# Patient Record
Sex: FEMALE | Race: WHITE | NOT HISPANIC OR LATINO | URBAN - METROPOLITAN AREA
[De-identification: names, ages, dates, MRNs, and addresses within clinical notes are randomized per-mention and may not be internally consistent; named-entity substitution may affect disease eponyms.]

---

## 2019-10-14 ENCOUNTER — OFFICE VISIT (OUTPATIENT)
Dept: ENDOCRINOLOGY | Facility: CLINIC | Age: 69
End: 2019-10-14

## 2019-10-14 VITALS
DIASTOLIC BLOOD PRESSURE: 68 MMHG | OXYGEN SATURATION: 98 % | HEART RATE: 75 BPM | WEIGHT: 210 LBS | HEIGHT: 67 IN | BODY MASS INDEX: 32.96 KG/M2 | SYSTOLIC BLOOD PRESSURE: 118 MMHG

## 2019-10-14 DIAGNOSIS — E11.65 UNCONTROLLED TYPE 2 DIABETES MELLITUS WITH HYPERGLYCEMIA (HCC): Primary | ICD-10-CM

## 2019-10-14 PROBLEM — E11.42 DIABETIC POLYNEUROPATHY ASSOCIATED WITH TYPE 2 DIABETES MELLITUS (HCC): Status: ACTIVE | Noted: 2019-10-14

## 2019-10-14 PROBLEM — G47.00 INSOMNIA: Status: ACTIVE | Noted: 2019-10-14

## 2019-10-14 PROBLEM — I48.91 ATRIAL FIBRILLATION (HCC): Status: ACTIVE | Noted: 2019-10-14

## 2019-10-14 PROBLEM — E11.9 DIABETES MELLITUS TYPE II, CONTROLLED (HCC): Status: ACTIVE | Noted: 2019-10-14

## 2019-10-14 PROBLEM — E78.2 MIXED HYPERLIPIDEMIA: Status: ACTIVE | Noted: 2019-10-14

## 2019-10-14 PROBLEM — G25.81 RESTLESS LEG SYNDROME: Status: ACTIVE | Noted: 2019-10-14

## 2019-10-14 LAB
GLUCOSE BLDC GLUCOMTR-MCNC: 217 MG/DL (ref 70–130)
HBA1C MFR BLD: 9.2 %

## 2019-10-14 PROCEDURE — 82947 ASSAY GLUCOSE BLOOD QUANT: CPT | Performed by: INTERNAL MEDICINE

## 2019-10-14 PROCEDURE — 83036 HEMOGLOBIN GLYCOSYLATED A1C: CPT | Performed by: INTERNAL MEDICINE

## 2019-10-14 PROCEDURE — 99213 OFFICE O/P EST LOW 20 MIN: CPT | Performed by: INTERNAL MEDICINE

## 2019-10-14 RX ORDER — ROPINIROLE 0.5 MG/1
1 TABLET, FILM COATED ORAL
COMMUNITY

## 2019-10-14 RX ORDER — LANSOPRAZOLE 30 MG/1
30 CAPSULE, DELAYED RELEASE ORAL DAILY PRN
COMMUNITY
End: 2020-04-15

## 2019-10-14 RX ORDER — ZOLPIDEM TARTRATE 5 MG/1
5 TABLET ORAL
COMMUNITY

## 2019-10-14 RX ORDER — SIMVASTATIN 80 MG
80 TABLET ORAL DAILY
COMMUNITY

## 2019-10-14 RX ORDER — FLUTICASONE PROPIONATE 50 MCG
SPRAY, SUSPENSION (ML) NASAL
COMMUNITY

## 2019-10-14 RX ORDER — LORATADINE 10 MG/1
10 TABLET ORAL DAILY
COMMUNITY

## 2019-10-14 RX ORDER — MONTELUKAST SODIUM 10 MG/1
1 TABLET ORAL DAILY PRN
COMMUNITY
End: 2020-04-15

## 2019-10-14 RX ORDER — WARFARIN SODIUM 5 MG/1
5 TABLET ORAL DAILY
COMMUNITY

## 2019-10-14 RX ORDER — BLOOD-GLUCOSE METER
KIT MISCELLANEOUS
COMMUNITY
Start: 2010-07-19 | End: 2020-07-20

## 2019-10-14 RX ORDER — GABAPENTIN 300 MG/1
900 CAPSULE ORAL
COMMUNITY

## 2019-10-14 RX ORDER — SOTALOL HYDROCHLORIDE 120 MG/1
120 TABLET ORAL 2 TIMES DAILY
COMMUNITY

## 2019-10-14 RX ORDER — OLMESARTAN MEDOXOMIL 20 MG/1
20 TABLET ORAL DAILY
COMMUNITY

## 2019-10-14 NOTE — PATIENT INSTRUCTIONS
Continue levemir but increase to 50 units twice a day.     Take humalog 40 units before meals plus extra if your blood sugar is high.   If BG is 150-199: extra 3 units  -249: extra 6 units  -299: extra 9 units  -349: extra 12 units  +: extra 15 units.     If you aren't eating but your blood sugar is high, take only the correction insulin (if it has been 4 hrs since your last visit).     Contact the office if your blood sugars are often < 100 or > 200.

## 2019-10-14 NOTE — PROGRESS NOTES
"Chief Complaint   Patient presents with   • Follow-up   • Diabetes        HPI   Hiwot Nowak is a 69 y.o. female had concerns including Follow-up and Diabetes.    She is monitoring blood sugars 1-3 times per day. Average .   Current medications for diabetes include levemir 45 units BID and humalog 40 units with 2:50>150.  She rarely misses doses of levemir. Misses doses of humalog about 20% of the time.      The following portions of the patient's history were reviewed and updated as appropriate: allergies, current medications, past family history, past medical history, past social history, past surgical history and problem list.    Review of Systems   Constitutional: Negative.    HENT: Negative.    Eyes: Negative.    Respiratory: Negative.    Cardiovascular: Negative.    Gastrointestinal: Negative.    Endocrine: Positive for heat intolerance (pt states she states hot).   Genitourinary: Negative.    Musculoskeletal: Positive for back pain (pt has back probles).   Allergic/Immunologic: Negative.    Neurological: Negative.    Hematological: Negative.    Psychiatric/Behavioral: Negative.       ROS was reviewed and verified. All other ROS negative.    /68   Pulse 75   Ht 170.2 cm (67\")   Wt 95.3 kg (210 lb)   SpO2 98%   BMI 32.89 kg/m²      Physical Exam     Constitutional:  well developed; well nourished  no acute distress   ENT/Thyroid: not examined   Eyes: EOM intact  Conjunctiva: clear   Respiratory:  breathing is unlabored   Cardiovascular:  Not performed.   Chest:  Not performed.   Abdomen: Not performed.   : Not performed.   Musculoskeletal: negative findings:  ROM of all joints is normal, no deformities present   Skin: dry and warm   Neuro: normal without focal findings, mental status, speech normal, alert and oriented x3 and ABEL   Psych: oriented to time, place and person, mood and affect are within normal limits     HbA1c:  Lab Results   Component Value Date    HGBA1C 9.2 10/14/2019 "     Assessment and Plan    Hiwot was seen today for follow-up and diabetes.    Diagnoses and all orders for this visit:    Uncontrolled type 2 diabetes mellitus with hyperglycemia (CMS/HCC)  Uncontrolled with A1c 9.2.  Complicated by neuropathy.  She continues to miss doses of insulin frequently.  Her BG's will not be controlled with missed doses.  Increase Levemir to 50 units twice daily.  Continue Humalog 40 units with meals but increase correction to 3: 50 greater than 150.  No metformin due to history of GI intolerance.  No SGLT2 inhibitors due to history of yeast infections.  Other non-insulin medications limited due to cost.  Labs UTD from 4/19. Ophtho exam UTD yearly. Follows with podiatry.   -     POC Glycosylated Hemoglobin (Hb A1C)  -     POCT Glucose  -     insulin detemir (LEVEMIR FLEXTOUCH) 100 UNIT/ML injection; Inject 50 Units under the skin into the appropriate area as directed Every 12 (Twelve) Hours.  -     Insulin Lispro (HUMALOG ARUNA KWIKPEN) 100 UNIT/ML solution pen-injector; 40 units before meals plus 3 units for every 50 pts BG >150    Return in about 3 months (around 1/14/2020) for next scheduled follow up. The patient was instructed to contact the clinic with any interval questions or concerns.    Laney Alberto, DO   Endocrinologist    Please note that portions of this document were completed with a voice recognition program. Efforts were made to edit the dictations, but occasionally words are mis-transcribed.

## 2020-01-15 ENCOUNTER — OFFICE VISIT (OUTPATIENT)
Dept: ENDOCRINOLOGY | Facility: CLINIC | Age: 70
End: 2020-01-15

## 2020-01-15 VITALS
WEIGHT: 209.4 LBS | BODY MASS INDEX: 32.87 KG/M2 | OXYGEN SATURATION: 96 % | DIASTOLIC BLOOD PRESSURE: 84 MMHG | HEIGHT: 67 IN | HEART RATE: 96 BPM | SYSTOLIC BLOOD PRESSURE: 122 MMHG

## 2020-01-15 DIAGNOSIS — Z91.199 NONCOMPLIANCE WITH DIABETES TREATMENT: ICD-10-CM

## 2020-01-15 DIAGNOSIS — E11.65 UNCONTROLLED TYPE 2 DIABETES MELLITUS WITH HYPERGLYCEMIA (HCC): Primary | ICD-10-CM

## 2020-01-15 DIAGNOSIS — E66.09 CLASS 1 OBESITY DUE TO EXCESS CALORIES WITH SERIOUS COMORBIDITY AND BODY MASS INDEX (BMI) OF 32.0 TO 32.9 IN ADULT: ICD-10-CM

## 2020-01-15 PROBLEM — I10 ESSENTIAL HYPERTENSION: Status: ACTIVE | Noted: 2017-03-20

## 2020-01-15 PROBLEM — K76.0 NONALCOHOLIC FATTY LIVER DISEASE: Status: ACTIVE | Noted: 2017-03-20

## 2020-01-15 PROBLEM — Z85.3 HISTORY OF MALIGNANT NEOPLASM OF BREAST: Status: ACTIVE | Noted: 2017-03-20

## 2020-01-15 PROBLEM — IMO0002 EXCESSIVE ANTICOAGULATION: Status: ACTIVE | Noted: 2019-10-30

## 2020-01-15 PROBLEM — D50.9 IRON DEFICIENCY ANEMIA: Status: ACTIVE | Noted: 2017-03-20

## 2020-01-15 PROBLEM — Z95.0 CARDIAC PACEMAKER IN SITU: Status: ACTIVE | Noted: 2019-10-30

## 2020-01-15 PROBLEM — M85.80 OSTEOPENIA: Status: ACTIVE | Noted: 2017-03-20

## 2020-01-15 PROBLEM — E11.9 DIABETES MELLITUS TYPE II, CONTROLLED (HCC): Status: RESOLVED | Noted: 2019-10-14 | Resolved: 2020-01-15

## 2020-01-15 PROBLEM — D12.6 ADENOMATOUS POLYP OF COLON: Status: ACTIVE | Noted: 2017-03-20

## 2020-01-15 PROBLEM — K58.0 IRRITABLE BOWEL SYNDROME WITH DIARRHEA: Status: ACTIVE | Noted: 2018-02-08

## 2020-01-15 PROBLEM — K21.9 GASTROESOPHAGEAL REFLUX DISEASE WITHOUT ESOPHAGITIS: Status: ACTIVE | Noted: 2017-03-20

## 2020-01-15 LAB
GLUCOSE BLDC GLUCOMTR-MCNC: 210 MG/DL (ref 70–130)
HBA1C MFR BLD: 10.6 %

## 2020-01-15 PROCEDURE — 82947 ASSAY GLUCOSE BLOOD QUANT: CPT | Performed by: INTERNAL MEDICINE

## 2020-01-15 PROCEDURE — 99214 OFFICE O/P EST MOD 30 MIN: CPT | Performed by: INTERNAL MEDICINE

## 2020-01-15 PROCEDURE — 83036 HEMOGLOBIN GLYCOSYLATED A1C: CPT | Performed by: INTERNAL MEDICINE

## 2020-01-15 RX ORDER — CLINDAMYCIN HYDROCHLORIDE 300 MG/1
CAPSULE ORAL
COMMUNITY
Start: 2020-01-13 | End: 2020-04-15

## 2020-01-15 RX ORDER — NYSTATIN 100000 U/G
OINTMENT TOPICAL
COMMUNITY

## 2020-01-15 NOTE — PROGRESS NOTES
"Chief Complaint   Patient presents with   • Diabetes     Pt here for 3 month follow up for Type 2 Diabetes        HPI   Hiwot Nowak is a 69 y.o. female had concerns including Diabetes (Pt here for 3 month follow up for Type 2 Diabetes).    She is monitoring blood sugars 1-2 times per day. Most BGs are in the 200s and 300s. Rarely in the 100s.   She has not called despite BGs being uncontrolled.   Current medications for diabetes include levemir 50 units twice daily and humalog 40 units with meals plus 3:50>150.  She is in the program at [place] for reduced insulin cost. She is not on a GLP-1 agonist due to cost.   Her diet is not good. She eats fast food often. Weight is stable since her last visit.  She has considered weight loss surgery.   Complains of fatigue. No other new concerns today.    The following portions of the patient's history were reviewed and updated as appropriate: allergies, current medications, past family history, past medical history, past social history, past surgical history and problem list.    Review of Systems   Constitutional: Positive for fatigue.   HENT: Negative.    Eyes: Negative.    Respiratory: Negative.    Cardiovascular: Negative.    Gastrointestinal: Negative.    Endocrine: Negative.    Genitourinary: Negative.    Musculoskeletal: Negative.    Skin: Negative.    Allergic/Immunologic: Negative.    Neurological: Negative.    Hematological: Negative.    Psychiatric/Behavioral: Negative.       ROS was reviewed and verified. All other ROS negative.    /84 (BP Location: Right arm, Patient Position: Sitting, Cuff Size: Adult)   Pulse 96   Ht 170.2 cm (67\")   Wt 95 kg (209 lb 6.4 oz)   SpO2 96%   Breastfeeding No   BMI 32.80 kg/m²      Physical Exam     Constitutional:  well developed; well nourished  no acute distress  obese - Body mass index is 32.8 kg/m².   ENT/Thyroid: no thyromegaly  no palpable nodules   Eyes: EOM intact  Conjunctiva: clear   Respiratory:  breathing is " unlabored  clear to auscultation bilaterally   Cardiovascular:  regular rate and rhythm, S1, S2 normal, no murmur, click, rub or gallop   Chest:  Not performed.   Abdomen: Not performed.   : Not performed.   Musculoskeletal: negative findings:  ROM of all joints is normal, no deformities present   Skin: dry and warm   Neuro: normal without focal findings, mental status, speech normal, alert and oriented x3 and ABEL   Psych: oriented to time, place and person, mood and affect are within normal limits     HbA1c:  Lab Results   Component Value Date    HGBA1C 10.6 01/15/2020    HGBA1C 9.2 10/14/2019     Glucose:    Lab Results   Component Value Date    POCGLU 210 (A) 01/15/2020     Assessment and Plan    Hiwot was seen today for diabetes.    Diagnoses and all orders for this visit:    Uncontrolled type 2 diabetes mellitus with hyperglycemia (CMS/HCC)  Uncontrolled with A1c up to 10.6 from 9.2 at her last visit.    She must monitor BG's more frequently, at least 3-4 times daily and take all insulin as prescribed.  Continue Levemir 50 units twice daily, Humalog 40 units 3 times daily with meals +3: 50 greater than 150 correction.  Dietary modifications are necessary.  Goal for weight loss.  If her BG's do not improve with most less than 200 over the next 2 weeks with dietary modifications and improved compliance with her regimen, I have asked that she call the office for insulin titration instructions.  Metformin due to history of GI intolerance.  No SGLT2 inhibitor due to history of yeast infections.  GLP-1 agonists have not been an option due to cost.  Labs are up-to-date from April 2019.  She was following with podiatry.  Ophtho exam is up-to-date yearly.  Follow-up in 6 weeks.  -     POC Glucose  -     POC Glycosylated Hemoglobin (Hb A1C)    Class 1 obesity due to excess calories with serious comorbidity and body mass index (BMI) of 32.0 to 32.9 in adult  BMI 32.8.  Weight loss is necessary for BG control and  overall health.  Discussed dietary modifications.    Noncompliance with diabetes treatment  The last several visits we have had the same discussion: necessity of checking BG's with every meal and taking all insulin doses as prescribed.  The patient has not done this which has in fact resulted in worsening control of her diabetes with A1c trending up.  I will agree to see her more frequently for now, with hopes that more regular visits will help her maintain better management of her diabetes. However, if she is unable to comply with recommendations, she can follow-up with PCP for diabetes management as specialty care isn't needed if no change is made in her home management.       Return in about 6 weeks (around 2/26/2020) for next scheduled follow up. The patient was instructed to contact the clinic with any interval questions or concerns.    Laney Alberto, DO   Endocrinologist    Please note that portions of this document were completed with a voice recognition program. Efforts were made to edit the dictations, but occasionally words are mis-transcribed.

## 2020-04-07 ENCOUNTER — TELEPHONE (OUTPATIENT)
Dept: ENDOCRINOLOGY | Facility: CLINIC | Age: 70
End: 2020-04-07

## 2020-04-07 DIAGNOSIS — E11.65 UNCONTROLLED TYPE 2 DIABETES MELLITUS WITH HYPERGLYCEMIA (HCC): Primary | ICD-10-CM

## 2020-04-07 NOTE — TELEPHONE ENCOUNTER
NELDA CALLED TO SEE IF PT NEEDS TO HAVE LABS DRAWN BEFORE HER APPT ON 4/15/2020    PLEASE CALL NELDA [phone]

## 2020-04-08 ENCOUNTER — RESULTS ENCOUNTER (OUTPATIENT)
Dept: ENDOCRINOLOGY | Facility: CLINIC | Age: 70
End: 2020-04-08

## 2020-04-08 DIAGNOSIS — E11.65 UNCONTROLLED TYPE 2 DIABETES MELLITUS WITH HYPERGLYCEMIA (HCC): ICD-10-CM

## 2020-04-15 ENCOUNTER — OFFICE VISIT (OUTPATIENT)
Dept: ENDOCRINOLOGY | Facility: CLINIC | Age: 70
End: 2020-04-15

## 2020-04-15 VITALS — HEIGHT: 67 IN | WEIGHT: 215 LBS | BODY MASS INDEX: 33.74 KG/M2

## 2020-04-15 DIAGNOSIS — E11.65 UNCONTROLLED TYPE 2 DIABETES MELLITUS WITH HYPERGLYCEMIA (HCC): Primary | ICD-10-CM

## 2020-04-15 DIAGNOSIS — K76.0 FATTY LIVER: ICD-10-CM

## 2020-04-15 DIAGNOSIS — E66.09 CLASS 1 OBESITY DUE TO EXCESS CALORIES WITH SERIOUS COMORBIDITY AND BODY MASS INDEX (BMI) OF 33.0 TO 33.9 IN ADULT: ICD-10-CM

## 2020-04-15 PROCEDURE — 99213 OFFICE O/P EST LOW 20 MIN: CPT | Performed by: INTERNAL MEDICINE

## 2020-04-15 RX ORDER — LIDOCAINE HYDROCHLORIDE 20 MG/ML
JELLY TOPICAL
COMMUNITY
Start: 2020-02-06

## 2020-04-15 NOTE — PROGRESS NOTES
"You have chosen to receive care through a telephone visit. Do you consent to use a telephone visit for your medical care today? Yes    Chief Complaint   Patient presents with   • Diabetes     Follow up        HPI   Hiwot Nowak is a 69 y.o. female had concerns including Diabetes (Follow up).      Unable to complete visit using a video connection to the patient. A phone visit was used to complete this visits. Total time of discussion was 15 minutes.    She is checking blood sugars 4 times per day.  Current medications for diabetes include levemir 55 units twice daily and humalog 45 units plus 3:50>150.  Her most common dose of humalog is 51.   Weight continues to increase. Diet is still not good at times.     BGs have been 200s-400s. Today BGs have been in the 100s due to poor appetite. She has not called the office due to high BGs since I last spoke to her.    She is still considering weight loss surgery.     The following portions of the patient's history were reviewed and updated as appropriate: allergies, current medications, past family history, past medical history, past social history, past surgical history and problem list.    Review of Systems   Constitutional: Negative.    HENT: Negative.    Eyes: Negative.    Respiratory: Negative.    Cardiovascular: Negative.    Gastrointestinal: Negative.    Endocrine: Negative.    Genitourinary: Negative.    Musculoskeletal: Negative.    Skin: Negative.    Allergic/Immunologic: Negative.    Neurological: Negative.    Hematological: Negative.    Psychiatric/Behavioral: Negative.       ROS was reviewed and verified. All other ROS negative.    Ht 170.2 cm (67\")   Wt 97.5 kg (215 lb)   Breastfeeding No   BMI 33.67 kg/m²       Physical Exam     Constitutional:  No acute distress   ENT/Thyroid: not examined   Eyes: not examined   Respiratory:  no conversational dyspnea   Cardiovascular:  Not performed.   Chest:  Not performed.   Abdomen: Not performed.   : Not performed. "   Musculoskeletal: Not examined   Skin: not performed.   Neuro: mental status, speech normal, alert and oriented x3   Psych: oriented to time, place and person, mood and affect are within normal limits     HbA1c:  Lab Results   Component Value Date    HGBA1C 10.6 01/15/2020    HGBA1C 9.2 10/14/2019     Glucose:    Lab Results   Component Value Date    POCGLU 210 (A) 01/15/2020     Labs from 4/9/2020 show urine microalbumin to creatinine ratio normal, lipid panel with LDL 80, TSH 3.53, CMP with creatinine 0.6, AST 60, ALT 47, CBC within normal limits with exception of platelets slightly low at 127    Assessment and Plan    Hiwot was seen today for diabetes.    Diagnoses and all orders for this visit:    Uncontrolled type 2 diabetes mellitus with hyperglycemia (CMS/[facility])  Uncontrolled with persistent hyperglycemia.  Patient has not called the office as directed for persistent high BG's.  No metformin due to history of GI intolerance.  No SGLT2 inhibitors due to yeast infections.  Cost has been prohibitive to try GLP-1 agonist.  She must increase insulin and/or improve her diet and work towards weight loss.  In the meantime, recommend that she increase her Levemir to 65 units twice daily and continue Humalog 45 units with meals +3: 50 greater than 150.  Recommend that she consider transition to U-500 insulin twice daily to 3 times daily.  She will need to determine if this is covered under the 340 be medication plan at [facility].  If so, can transition to a dose of 130 units twice daily before breakfast and supper and discontinue her current insulin regimen.  Labs are up-to-date from 4/9/2020.  Ophtho-exam is up-to-date yearly.  -     insulin detemir (Levemir FlexTouch) 100 UNIT/ML injection; Inject 65 Units under the skin into the appropriate area as directed Every 12 (Twelve) Hours.    Class 1 obesity due to excess calories with serious comorbidity  BMI 33.7.  Patient has continued to gain weight due to lack of  significant improvement in diet and increasing doses of insulin.  As insulin doses continue to increase as needed to control her BG's, she is at risk for continued weight gain if she does not improve her diet.  Weight loss is needed for diabetes control and overall health.    Fatty liver  Mild elevation of LFTs on recent labs.  Monitor yearly.  Weight loss recommended as above.    Return in about 3 months (around 7/15/2020). The patient was instructed to contact the clinic with any interval questions or concerns.    Laney Alberto, DO   Endocrinologist    Please note that portions of this document were completed with a voice recognition program. Efforts were made to edit the dictations, but occasionally words are mis-transcribed.

## 2020-04-29 ENCOUNTER — TELEPHONE (OUTPATIENT)
Dept: ENDOCRINOLOGY | Facility: CLINIC | Age: 70
End: 2020-04-29

## 2020-04-29 DIAGNOSIS — E11.65 UNCONTROLLED TYPE 2 DIABETES MELLITUS WITH HYPERGLYCEMIA (HCC): Primary | ICD-10-CM

## 2020-04-29 NOTE — TELEPHONE ENCOUNTER
I called and confirmed dose of U-500 130 units BID before breakfast and supper. Will titrate dose if not controlled. Pt will call the office.

## 2020-04-29 NOTE — TELEPHONE ENCOUNTER
Per last office note with Dr Alberto: Recommend that she consider transition to U-500 insulin twice daily to 3 times daily.  She will need to determine if this is covered under the 340 be medication plan at [facility].  If so, can transition to a dose of 130 units twice daily before breakfast and supper and discontinue her current insulin regimen.    Is this what I need to call and tell her? Thanks!

## 2020-04-29 NOTE — TELEPHONE ENCOUNTER
Patient wanted to know which units of insulin (U-500) she needs to start on. Please contact patient [phone]

## 2020-07-20 ENCOUNTER — OFFICE VISIT (OUTPATIENT)
Dept: ENDOCRINOLOGY | Facility: CLINIC | Age: 70
End: 2020-07-20

## 2020-07-20 VITALS
HEIGHT: 67 IN | WEIGHT: 219 LBS | DIASTOLIC BLOOD PRESSURE: 72 MMHG | SYSTOLIC BLOOD PRESSURE: 120 MMHG | HEART RATE: 111 BPM | BODY MASS INDEX: 34.37 KG/M2 | OXYGEN SATURATION: 96 %

## 2020-07-20 DIAGNOSIS — E66.09 CLASS 1 OBESITY DUE TO EXCESS CALORIES WITH SERIOUS COMORBIDITY AND BODY MASS INDEX (BMI) OF 34.0 TO 34.9 IN ADULT: ICD-10-CM

## 2020-07-20 DIAGNOSIS — E11.65 UNCONTROLLED TYPE 2 DIABETES MELLITUS WITH HYPERGLYCEMIA (HCC): Primary | ICD-10-CM

## 2020-07-20 LAB
EXPIRATION DATE: NORMAL
EXPIRATION DATE: NORMAL
GLUCOSE BLDC GLUCOMTR-MCNC: 119 MG/DL (ref 70–130)
HBA1C MFR BLD: 7.9 %
Lab: NORMAL
Lab: NORMAL

## 2020-07-20 PROCEDURE — 83036 HEMOGLOBIN GLYCOSYLATED A1C: CPT | Performed by: INTERNAL MEDICINE

## 2020-07-20 PROCEDURE — 99214 OFFICE O/P EST MOD 30 MIN: CPT | Performed by: INTERNAL MEDICINE

## 2020-07-20 PROCEDURE — 82947 ASSAY GLUCOSE BLOOD QUANT: CPT | Performed by: INTERNAL MEDICINE

## 2020-07-20 RX ORDER — OMEPRAZOLE 40 MG/1
CAPSULE, DELAYED RELEASE ORAL
COMMUNITY

## 2020-07-20 NOTE — PROGRESS NOTES
"Chief Complaint   Patient presents with   • Diabetes     DM2 f/u         HPI   Hiwot Nowak is a 70 y.o. female had concerns including Diabetes (DM2 f/u ).    She is checking blood sugars 2-3 times per day. Most all BGs are in the 100s. She had one recent episode of hypoglycemia with a glucose of 44.  Also has had occasional hyperglycemia with highest glucose in the last few weeks of 228.  She has on a rare occasion taken the insulin without eating.  She is unsure what may be contributing to some of the other highs and lows she is experiencing.  Is on U-500 130 units twice daily. She eats her first meal around 11 AM and second meal around 5 PM.     Her A1c today is much improved to 7.9 from 10.6.    Weight has increased by 5 lbs since her last visit here. Patient is still interested in getting gastric bypass surgery.    The following portions of the patient's history were reviewed and updated as appropriate: allergies, current medications, past family history, past medical history, past social history, past surgical history and problem list.    Review of Systems   Constitutional: Positive for unexpected weight gain.   HENT: Negative.    Eyes: Negative.    Respiratory: Negative.    Cardiovascular: Negative.    Gastrointestinal: Negative.    Endocrine: Negative.    Genitourinary: Negative.    Musculoskeletal: Negative.    Skin: Negative.    Allergic/Immunologic: Negative.    Neurological: Positive for numbness.   Hematological: Negative.    Psychiatric/Behavioral: Negative.       ROS was reviewed and verified. All other ROS negative.    /72   Pulse 111   Ht 170.2 cm (67\")   Wt 99.3 kg (219 lb)   SpO2 96%   BMI 34.30 kg/m²      Physical Exam    Hiwot had a diabetic foot exam performed today.   During the foot exam she had a monofilament test performed (2/5 bilaterally).  Vascular Status -  Her right foot exhibits normal foot vasculature  and no edema. Her left foot exhibits normal foot vasculature  and no " edema.  Skin Integrity  -  Her right foot skin is intact.Her left foot skin is intact..       Constitutional:  well developed; well nourished  no acute distress  obese - Body mass index is 34.3 kg/m².   ENT/Thyroid: no thyromegaly  no palpable nodules   Eyes: EOM intact  Conjunctiva: clear   Respiratory:  breathing is unlabored  clear to auscultation bilaterally   Cardiovascular:  regular rate and rhythm, S1, S2 normal, no murmur, click, rub or gallop   Chest:  Not performed.   Abdomen: Not performed.   : Not performed.   Musculoskeletal: negative findings:  ROM of all joints is normal, no deformities present   Skin: dry and warm   Neuro: normal without focal findings and mental status, speech normal, alert and oriented x3   Psych: oriented to time, place and person, mood and affect are within normal limits     HbA1c:  Lab Results   Component Value Date    HGBA1C 7.9 07/20/2020    HGBA1C 10.6 01/15/2020     Glucose:    Lab Results   Component Value Date    POCGLU 119 07/20/2020       Assessment and Plan    Hiwot was seen today for diabetes.    Diagnoses and all orders for this visit:    Uncontrolled type 2 diabetes mellitus with hyperglycemia (CMS/[facility])  Uncontrolled with A1c 7.9 but significantly improved from last A1c at 10.6.  Complicated by neuropathy.  Continue U-500 130 units twice daily before meals.  No metformin due to history of GI intolerance.  No SGLT2 inhibitor due to history of yeast infections.  Cost has been prohibitive, not allowing us to use a GLP-1 agonist.  I have asked the patient keep a glucose log with food intake to determine when she is experiencing the occasional hyper and hypoglycemia therefore, we can make adjustments to her insulin regimen.  She may be able to increase or decrease the U-500 x 10 units prior to meals depending on the amount of carbs consumed to avoid hyper and hypoglycemia.  Labs are up-to-date from April 2020.  Ophtho-exam is up-to-date and scheduled again in November  2020.  Monofilament was updated today.  -     POC Glucose Fingerstick  -     POC Glycosylated Hemoglobin (Hb A1C)    Class 1 obesity due to excess calories with serious comorbidity and body mass index (BMI) of 34.0 to 34.9 in adult  BMI 34.3.  Patient is hoping to qualify for weight loss surgery.  We have discussed dietary and lifestyle modifications in detail in the past.       Return in about 3 months (around 10/20/2020) for next scheduled follow up with [facility]. The patient was instructed to contact the clinic with any interval questions or concerns.    Laney Alberto, DO   Endocrinologist    Please note that portions of this document were completed with a voice recognition program. Efforts were made to edit the dictations, but occasionally words are mis-transcribed.

## 2020-09-22 ENCOUNTER — TELEPHONE (OUTPATIENT)
Dept: ENDOCRINOLOGY | Facility: CLINIC | Age: 70
End: 2020-09-22

## 2020-09-22 NOTE — TELEPHONE ENCOUNTER
Insulin is unlikely to cause swelling. With the swelling and shortness of breath, I actually would have some concern this could be her heart (potentially heart failure - causing her to retain fluid which can cause shortness of breath). She should see her primary care provider for an evaluation and may need to see cardiology.

## 2020-09-22 NOTE — TELEPHONE ENCOUNTER
Patient called this morning stating that she has been having a lot of swelling in lower extremities, as well as in her hands. She states that she has been experiencing this issue for one month or more. She also adds that she has been feeling fatigued and short of air for about the same amount of time. She feels that this may be related to the new insulin Rx as these symptoms began about the same time she began using Humulin R U-500 Kwikpen prescribed by Dr Alberto.     Call back [phone]

## 2020-09-23 NOTE — TELEPHONE ENCOUNTER
Called and spoke to patient, informed of Dr. Alberto's message. She verbalized understanding and had no further questions.

## 2020-10-26 ENCOUNTER — OFFICE VISIT (OUTPATIENT)
Dept: ENDOCRINOLOGY | Facility: CLINIC | Age: 70
End: 2020-10-26

## 2020-10-26 VITALS
DIASTOLIC BLOOD PRESSURE: 72 MMHG | SYSTOLIC BLOOD PRESSURE: 120 MMHG | BODY MASS INDEX: 35.43 KG/M2 | HEART RATE: 84 BPM | WEIGHT: 226.2 LBS | OXYGEN SATURATION: 98 %

## 2020-10-26 DIAGNOSIS — E66.9 OBESITY (BMI 35.0-39.9 WITHOUT COMORBIDITY): ICD-10-CM

## 2020-10-26 DIAGNOSIS — E11.649 DIABETIC HYPOGLYCEMIA (HCC): ICD-10-CM

## 2020-10-26 DIAGNOSIS — E11.65 UNCONTROLLED TYPE 2 DIABETES MELLITUS WITH HYPERGLYCEMIA (HCC): Primary | ICD-10-CM

## 2020-10-26 LAB
GLUCOSE BLDC GLUCOMTR-MCNC: 56 MG/DL (ref 70–130)
HBA1C MFR BLD: 7.7 %

## 2020-10-26 PROCEDURE — 82947 ASSAY GLUCOSE BLOOD QUANT: CPT | Performed by: PHYSICIAN ASSISTANT

## 2020-10-26 PROCEDURE — 83036 HEMOGLOBIN GLYCOSYLATED A1C: CPT | Performed by: PHYSICIAN ASSISTANT

## 2020-10-26 PROCEDURE — 99214 OFFICE O/P EST MOD 30 MIN: CPT | Performed by: PHYSICIAN ASSISTANT

## 2020-10-26 RX ORDER — ALENDRONATE SODIUM 70 MG/1
TABLET ORAL
COMMUNITY
Start: 2020-07-28

## 2020-10-26 NOTE — PROGRESS NOTES
Chief Complaint   Patient presents with   • Follow-up     DM 2        HPI   Hiwot Nowak is a 70 y.o. female had concerns including Follow-up (DM 2).    She is checking blood sugars 2 times per day. She reports fasting blood sugar mostly in the low 100s, before evening meal averaging 170s, reports hypoglycemia about once a week during the day- although no meter or log for review. Blood sugar during visit today is 56. He is symptomatic with this.   Is on U-500 130-135 units twice daily, varies dose based on blood sugar reading. She eats her first meal around 11 AM and second meal around 5 PM. She always takes insulin about 30 minutes before eating.   She eats out a lot. Hamburger is a typical drive through meal. Does not get fries. Has gone back to regular soda.   Has gained about 7 pounds since last visit.   She is interested in weight loss surgery. Read somewhere she would not qualify based on her age.       The following portions of the patient's history were reviewed and updated as appropriate: allergies, current medications, past family history, past medical history, past social history, past surgical history and problem list.    Review of Systems   Constitutional: Positive for unexpected weight gain.   HENT: Negative.    Eyes: Negative.    Respiratory: Negative.    Cardiovascular: Negative.    Gastrointestinal: Negative.    Endocrine: Negative.    Genitourinary: Negative.    Musculoskeletal: Negative.    Skin: Negative.    Allergic/Immunologic: Negative.    Neurological: Positive for numbness.   Hematological: Negative.    Psychiatric/Behavioral: Negative.       ROS was reviewed and verified. All other ROS negative.    /72   Pulse 84   Wt 103 kg (226 lb 3.2 oz)   SpO2 98%   BMI 35.43 kg/m²      Physical Exam     Constitutional:  well developed; well nourished  no acute distress  obese - Body mass index is 35.43 kg/m².   ENT/Thyroid: no thyromegaly  no palpable nodules   Eyes: EOM intact  Conjunctiva:  clear   Respiratory:  breathing is unlabored  clear to auscultation bilaterally   Cardiovascular:  regular rate and rhythm, S1, S2 normal, no murmur, click, rub or gallop   Chest:  Not performed.   Abdomen: Not performed.   : Not performed.   Musculoskeletal: negative findings:  ROM of all joints is normal, no deformities present   Skin: dry and warm   Neuro: normal without focal findings and mental status, speech normal, alert and oriented x3   Psych: oriented to time, place and person, mood and affect are within normal limits     HbA1c:  Lab Results   Component Value Date    HGBA1C 7.7 10/26/2020    HGBA1C 7.9 07/20/2020     Glucose:    Lab Results   Component Value Date    POCGLU 56 (A) 10/26/2020       Assessment and Plan    Hiwot was seen today for diabetes.    Diagnoses and all orders for this visit:    Uncontrolled type 2 diabetes mellitus with hyperglycemia (CMS/[facility])  Uncontrolled with A1c 7.7 but overall improved from 10.6 1/2020  She continues to have some hypoglycemia during the day  Complicated by neuropathy.  Continue U-500 130 units twice daily before meals.  No metformin due to history of GI intolerance.  No SGLT2 inhibitor due to history of yeast infections.  Cost has been prohibitive, not allowing us to use a GLP-1 agonist.  We discussed taking extra 5 units for higher carb meals and 5 units less for lower carb meals  Again encouraged patient to keep a log of blood sugar readings, food eaten, and amount of insulin taken to assess best insulin dose for for her meals.   Labs are up-to-date from April 2020.  Ophtho-exam is up-to-date and scheduled again in November 2020.  Monofilament was updated 7/2020.  -     POC Glucose Fingerstick  -     POC Glycosylated Hemoglobin (Hb A1C)    Hypoglycemia  -Blood sugar of 56 during visit, symptomatic  -Treated with juice and PB crackers  -Repeat sugar 103- pt is driving, but brother is with her    Class 1 obesity due to excess calories with serious comorbidity  and body mass index (BMI) of 34.0 to 34.9 in adult  BMI 35.4. We have discussed dietary and lifestyle modifications- eliminating sugared drinks is priority       Return in about 3 months (around 1/26/2021) for Dr. Alberto. The patient was instructed to contact the clinic with any interval questions or concerns.    Eula Hernandez PA-C

## 2021-02-22 ENCOUNTER — TELEPHONE (OUTPATIENT)
Dept: ENDOCRINOLOGY | Facility: CLINIC | Age: 71
End: 2021-02-22